# Patient Record
Sex: MALE | Race: WHITE | Employment: OTHER | URBAN - METROPOLITAN AREA
[De-identification: names, ages, dates, MRNs, and addresses within clinical notes are randomized per-mention and may not be internally consistent; named-entity substitution may affect disease eponyms.]

---

## 2019-10-28 ENCOUNTER — OFFICE VISIT (OUTPATIENT)
Dept: FAMILY MEDICINE CLINIC | Age: 34
End: 2019-10-28
Payer: MEDICAID

## 2019-10-28 VITALS
RESPIRATION RATE: 16 BRPM | BODY MASS INDEX: 24.88 KG/M2 | SYSTOLIC BLOOD PRESSURE: 107 MMHG | WEIGHT: 168 LBS | HEIGHT: 69 IN | TEMPERATURE: 97.9 F | DIASTOLIC BLOOD PRESSURE: 68 MMHG | HEART RATE: 80 BPM | OXYGEN SATURATION: 96 %

## 2019-10-28 DIAGNOSIS — F12.10 MARIJUANA ABUSE: ICD-10-CM

## 2019-10-28 DIAGNOSIS — F11.10 OPIOID ABUSE (HCC): ICD-10-CM

## 2019-10-28 DIAGNOSIS — Z72.0 TOBACCO ABUSE: ICD-10-CM

## 2019-10-28 DIAGNOSIS — F19.10 POLYSUBSTANCE ABUSE (HCC): ICD-10-CM

## 2019-10-28 DIAGNOSIS — Z23 NEED FOR IMMUNIZATION AGAINST INFLUENZA: ICD-10-CM

## 2019-10-28 DIAGNOSIS — N46.9 INFERTILITY MALE: Primary | ICD-10-CM

## 2019-10-28 PROCEDURE — G0008 ADMIN INFLUENZA VIRUS VAC: HCPCS

## 2019-10-28 PROCEDURE — 99202 OFFICE O/P NEW SF 15 MIN: CPT | Performed by: STUDENT IN AN ORGANIZED HEALTH CARE EDUCATION/TRAINING PROGRAM

## 2019-10-28 PROCEDURE — 99203 OFFICE O/P NEW LOW 30 MIN: CPT | Performed by: STUDENT IN AN ORGANIZED HEALTH CARE EDUCATION/TRAINING PROGRAM

## 2019-10-28 PROCEDURE — 6360000002 HC RX W HCPCS

## 2019-10-28 PROCEDURE — 90686 IIV4 VACC NO PRSV 0.5 ML IM: CPT

## 2019-10-29 ASSESSMENT — ENCOUNTER SYMPTOMS
ABDOMINAL PAIN: 0
CONSTIPATION: 0
DIARRHEA: 0
WHEEZING: 0
NAUSEA: 0
VOMITING: 0
SHORTNESS OF BREATH: 0

## 2019-11-05 ENCOUNTER — OFFICE VISIT (OUTPATIENT)
Dept: FAMILY MEDICINE CLINIC | Age: 34
End: 2019-11-05
Payer: MEDICAID

## 2019-11-05 VITALS
SYSTOLIC BLOOD PRESSURE: 102 MMHG | DIASTOLIC BLOOD PRESSURE: 60 MMHG | BODY MASS INDEX: 24.96 KG/M2 | TEMPERATURE: 98.3 F | OXYGEN SATURATION: 96 % | HEART RATE: 64 BPM | WEIGHT: 159 LBS | HEIGHT: 67 IN

## 2019-11-05 DIAGNOSIS — F19.10 POLYSUBSTANCE ABUSE (HCC): ICD-10-CM

## 2019-11-05 DIAGNOSIS — Z72.0 TOBACCO ABUSE: Primary | ICD-10-CM

## 2019-11-05 DIAGNOSIS — N46.9 INFERTILITY MALE: ICD-10-CM

## 2019-11-05 PROCEDURE — G8427 DOCREV CUR MEDS BY ELIG CLIN: HCPCS | Performed by: STUDENT IN AN ORGANIZED HEALTH CARE EDUCATION/TRAINING PROGRAM

## 2019-11-05 PROCEDURE — G8482 FLU IMMUNIZE ORDER/ADMIN: HCPCS | Performed by: STUDENT IN AN ORGANIZED HEALTH CARE EDUCATION/TRAINING PROGRAM

## 2019-11-05 PROCEDURE — 99212 OFFICE O/P EST SF 10 MIN: CPT | Performed by: STUDENT IN AN ORGANIZED HEALTH CARE EDUCATION/TRAINING PROGRAM

## 2019-11-05 PROCEDURE — G8420 CALC BMI NORM PARAMETERS: HCPCS | Performed by: STUDENT IN AN ORGANIZED HEALTH CARE EDUCATION/TRAINING PROGRAM

## 2019-11-05 PROCEDURE — 99213 OFFICE O/P EST LOW 20 MIN: CPT | Performed by: STUDENT IN AN ORGANIZED HEALTH CARE EDUCATION/TRAINING PROGRAM

## 2019-11-05 PROCEDURE — 4004F PT TOBACCO SCREEN RCVD TLK: CPT | Performed by: STUDENT IN AN ORGANIZED HEALTH CARE EDUCATION/TRAINING PROGRAM

## 2019-11-05 ASSESSMENT — ENCOUNTER SYMPTOMS
VOMITING: 0
ABDOMINAL PAIN: 0
WHEEZING: 0
NAUSEA: 0
DIARRHEA: 0
SHORTNESS OF BREATH: 0
CONSTIPATION: 0

## 2020-01-09 ENCOUNTER — HOSPITAL ENCOUNTER (OUTPATIENT)
Age: 35
Discharge: HOME | End: 2020-01-09
Payer: MEDICAID

## 2020-01-09 LAB
ESTRADIOL LEVEL: 24.3 PG/ML
FOLLICLE STIMULATING HORMONE: 6.3 MIU/ML
LUTEINIZING HORMONE: 9.4 MIU/ML
TESTOSTERONE TOTAL: 638.1 NG/DL
TSH SERPL DL<=0.05 MIU/L-ACNC: 2.12 UIU/ML (ref 0.27–4.2)

## 2020-01-09 PROCEDURE — 83002 ASSAY OF GONADOTROPIN (LH): CPT

## 2020-01-09 PROCEDURE — 36415 COLL VENOUS BLD VENIPUNCTURE: CPT

## 2020-01-09 PROCEDURE — 88262 CHROMOSOME ANALYSIS 15-20: CPT

## 2020-01-09 PROCEDURE — 84403 ASSAY OF TOTAL TESTOSTERONE: CPT

## 2020-01-09 PROCEDURE — 83001 ASSAY OF GONADOTROPIN (FSH): CPT

## 2020-01-09 PROCEDURE — 88230 TISSUE CULTURE LYMPHOCYTE: CPT

## 2020-01-09 PROCEDURE — 84443 ASSAY THYROID STIM HORMONE: CPT

## 2020-01-09 PROCEDURE — 82670 ASSAY OF TOTAL ESTRADIOL: CPT

## 2020-01-21 LAB
CHROMOSOME, BLOOD: NORMAL
EER CHROMOSOME ANALYSIS BLOOD: NORMAL

## 2020-02-28 PROBLEM — N46.11 OLIGOSPERMIA: Status: ACTIVE | Noted: 2020-02-28

## 2020-02-28 PROBLEM — R86.8 TERATOSPERMIA: Status: ACTIVE | Noted: 2020-02-28

## 2021-10-31 ENCOUNTER — APPOINTMENT (OUTPATIENT)
Dept: GENERAL RADIOLOGY | Age: 36
End: 2021-10-31
Payer: MEDICAID

## 2021-10-31 ENCOUNTER — HOSPITAL ENCOUNTER (EMERGENCY)
Age: 36
Discharge: LEFT BEFORE DISCHARGE | End: 2021-11-01
Attending: EMERGENCY MEDICINE
Payer: MEDICAID

## 2021-10-31 VITALS
HEART RATE: 66 BPM | TEMPERATURE: 97.8 F | OXYGEN SATURATION: 100 % | DIASTOLIC BLOOD PRESSURE: 66 MMHG | RESPIRATION RATE: 16 BRPM | SYSTOLIC BLOOD PRESSURE: 125 MMHG

## 2021-10-31 DIAGNOSIS — T50.901A ACCIDENTAL DRUG OVERDOSE, INITIAL ENCOUNTER: Primary | ICD-10-CM

## 2021-10-31 DIAGNOSIS — T39.1X1A ACCIDENTAL PARACETAMOL POISONING, INITIAL ENCOUNTER: ICD-10-CM

## 2021-10-31 LAB
ALBUMIN SERPL-MCNC: 4.8 G/DL (ref 3.5–5.2)
ALP BLD-CCNC: 105 U/L (ref 40–129)
ALT SERPL-CCNC: 9 U/L (ref 0–40)
AMPHETAMINE SCREEN, URINE: POSITIVE
ANION GAP SERPL CALCULATED.3IONS-SCNC: 13 MMOL/L (ref 7–16)
AST SERPL-CCNC: 17 U/L (ref 0–39)
BARBITURATE SCREEN URINE: NOT DETECTED
BASOPHILS ABSOLUTE: 0.06 E9/L (ref 0–0.2)
BASOPHILS RELATIVE PERCENT: 0.3 % (ref 0–2)
BENZODIAZEPINE SCREEN, URINE: NOT DETECTED
BILIRUB SERPL-MCNC: 0.5 MG/DL (ref 0–1.2)
BUN BLDV-MCNC: 9 MG/DL (ref 6–20)
CALCIUM SERPL-MCNC: 9.8 MG/DL (ref 8.6–10.2)
CANNABINOID SCREEN URINE: POSITIVE
CHLORIDE BLD-SCNC: 102 MMOL/L (ref 98–107)
CO2: 24 MMOL/L (ref 22–29)
COCAINE METABOLITE SCREEN URINE: NOT DETECTED
CREAT SERPL-MCNC: 0.9 MG/DL (ref 0.7–1.2)
EOSINOPHILS ABSOLUTE: 0 E9/L (ref 0.05–0.5)
EOSINOPHILS RELATIVE PERCENT: 0 % (ref 0–6)
FENTANYL SCREEN, URINE: POSITIVE
GFR AFRICAN AMERICAN: >60
GFR NON-AFRICAN AMERICAN: >60 ML/MIN/1.73
GLUCOSE BLD-MCNC: 127 MG/DL (ref 74–99)
HCT VFR BLD CALC: 48.7 % (ref 37–54)
HEMOGLOBIN: 15.7 G/DL (ref 12.5–16.5)
IMMATURE GRANULOCYTES #: 0.06 E9/L
IMMATURE GRANULOCYTES %: 0.3 % (ref 0–5)
LYMPHOCYTES ABSOLUTE: 1.83 E9/L (ref 1.5–4)
LYMPHOCYTES RELATIVE PERCENT: 10.6 % (ref 20–42)
Lab: ABNORMAL
MCH RBC QN AUTO: 25.3 PG (ref 26–35)
MCHC RBC AUTO-ENTMCNC: 32.2 % (ref 32–34.5)
MCV RBC AUTO: 78.5 FL (ref 80–99.9)
METHADONE SCREEN, URINE: NOT DETECTED
MONOCYTES ABSOLUTE: 1.04 E9/L (ref 0.1–0.95)
MONOCYTES RELATIVE PERCENT: 6 % (ref 2–12)
NEUTROPHILS ABSOLUTE: 14.3 E9/L (ref 1.8–7.3)
NEUTROPHILS RELATIVE PERCENT: 82.8 % (ref 43–80)
OPIATE SCREEN URINE: NOT DETECTED
OXYCODONE URINE: NOT DETECTED
PDW BLD-RTO: 13.9 FL (ref 11.5–15)
PHENCYCLIDINE SCREEN URINE: NOT DETECTED
PLATELET # BLD: 274 E9/L (ref 130–450)
PMV BLD AUTO: 10.1 FL (ref 7–12)
POTASSIUM REFLEX MAGNESIUM: 3.6 MMOL/L (ref 3.5–5)
RBC # BLD: 6.2 E12/L (ref 3.8–5.8)
SODIUM BLD-SCNC: 139 MMOL/L (ref 132–146)
TOTAL PROTEIN: 7.7 G/DL (ref 6.4–8.3)
WBC # BLD: 17.3 E9/L (ref 4.5–11.5)

## 2021-10-31 PROCEDURE — 99285 EMERGENCY DEPT VISIT HI MDM: CPT

## 2021-10-31 PROCEDURE — 71045 X-RAY EXAM CHEST 1 VIEW: CPT

## 2021-10-31 PROCEDURE — 80053 COMPREHEN METABOLIC PANEL: CPT

## 2021-10-31 PROCEDURE — 85025 COMPLETE CBC W/AUTO DIFF WBC: CPT

## 2021-10-31 PROCEDURE — 80143 DRUG ASSAY ACETAMINOPHEN: CPT

## 2021-10-31 PROCEDURE — 80307 DRUG TEST PRSMV CHEM ANLYZR: CPT

## 2021-10-31 PROCEDURE — 82077 ASSAY SPEC XCP UR&BREATH IA: CPT

## 2021-10-31 PROCEDURE — 80179 DRUG ASSAY SALICYLATE: CPT

## 2021-10-31 ASSESSMENT — ENCOUNTER SYMPTOMS
EYE DISCHARGE: 0
NAUSEA: 0
SHORTNESS OF BREATH: 0
SINUS PRESSURE: 0
DIARRHEA: 0
BACK PAIN: 0
COUGH: 0
VOMITING: 0
WHEEZING: 0
SORE THROAT: 0
ABDOMINAL PAIN: 0
EYE PAIN: 0
EYE REDNESS: 0

## 2021-11-01 LAB
ACETAMINOPHEN LEVEL: 10.1 MCG/ML (ref 10–30)
ACETAMINOPHEN LEVEL: 17.4 MCG/ML (ref 10–30)
ETHANOL: <10 MG/DL (ref 0–0.08)
ETHANOL: <10 MG/DL (ref 0–0.08)
SALICYLATE, SERUM: <0.3 MG/DL (ref 0–30)
SALICYLATE, SERUM: <0.3 MG/DL (ref 0–30)
TRICYCLIC ANTIDEPRESSANTS SCREEN SERUM: NEGATIVE NG/ML
TRICYCLIC ANTIDEPRESSANTS SCREEN SERUM: NEGATIVE NG/ML

## 2021-11-01 NOTE — ED NOTES
Pt takes off clothes and is in gown, pt states \" he is stressed out at home and just wanted to go to sleep. He does not want to harm himself or others, he told his brother he would stop taking pills so that's why he is here because he wants help\". Pt is very cooperative and restless.       John Hayes RN  10/31/21 [id number]

## 2021-11-01 NOTE — ED NOTES
Rounded on patient, pt and family member no longer in room. All belongings removed from room. Dr. Karl Banda aware.  Pt not pinkslipped or suicidal.     Mitul Borjas RN  11/01/21 0001

## 2021-11-01 NOTE — ED PROVIDER NOTES
Patient presents after taking numerous medications and drugs. Initially patient does not respond to my questioning however after mention of possible pink slipped he states that he does not have any suicidal or homicidal ideations. He is accompanied with his brother who states that the patient took Tylenol, NyQuil, Percocets, and ecstasy. Patient just responds that he has a pharmacy in his stomach. Patient states that he took the medication to feel good. He denies any complaints at this time but states that he does want help getting off the drugs. Patient denies any fevers, chest pain, shortness of breath, nausea, vomiting, or abdominal pain. The history is provided by the patient. No  was used. Review of Systems   Constitutional: Negative for chills and fever. HENT: Negative for ear pain, sinus pressure and sore throat. Eyes: Negative for pain, discharge and redness. Respiratory: Negative for cough, shortness of breath and wheezing. Cardiovascular: Negative for chest pain. Gastrointestinal: Negative for abdominal pain, diarrhea, nausea and vomiting. Genitourinary: Negative for dysuria and frequency. Musculoskeletal: Negative for arthralgias and back pain. Skin: Negative for rash and wound. Neurological: Negative for weakness and headaches. Hematological: Negative for adenopathy. Psychiatric/Behavioral: Negative for hallucinations and suicidal ideas. All other systems reviewed and are negative. Physical Exam  Vitals and nursing note reviewed. Constitutional:       Appearance: He is well-developed. Comments: Somnolent   HENT:      Head: Normocephalic and atraumatic. Eyes:      Conjunctiva/sclera: Conjunctivae normal.      Comments: Cataract right eye. Left pupil is 3 mm and reactive. Cardiovascular:      Rate and Rhythm: Normal rate and regular rhythm. Heart sounds: Normal heart sounds. No murmur heard.      Pulmonary:      Effort: Pulmonary effort is normal. No respiratory distress. Breath sounds: Normal breath sounds. No wheezing or rales. Abdominal:      General: Bowel sounds are normal.      Palpations: Abdomen is soft. Tenderness: There is no abdominal tenderness. There is no guarding or rebound. Musculoskeletal:         General: No tenderness or deformity. Cervical back: Normal range of motion and neck supple. Skin:     General: Skin is warm and dry. Neurological:      Mental Status: He is oriented to person, place, and time. Cranial Nerves: No cranial nerve deficit. Coordination: Coordination normal.          Procedures     MDM  Number of Diagnoses or Management Options  Diagnosis management comments: Patient presents after ingesting numerous medications. Patient does have a history of drug abuse. He denies any suicidal homicidal ideations at this time. Patient will not be pink slipped at this time. CBC was obtained that showed leukocytosis of 17.3. CMP did not show any elevations in transaminases. Chest x-ray did not show any acute cardiopulmonary processes. Urine drug screen was positive for amphetamines, cannabinoids, and fentanyl. Initial serum drug screen did show an elevated acetaminophen level at 17.4. A 4-hour serum drug screen was drawn and is pending however patient did elope prior to result. Patient will be called if the acetaminophen level is above 150. Amount and/or Complexity of Data Reviewed  Clinical lab tests: reviewed  Tests in the radiology section of CPT®: reviewed  Decide to obtain previous medical records or to obtain history from someone other than the patient: yes         ED Course as of Nov 01 0021   Mon Nov 01, 2021   0019 Patient had previously wanted to leave [place]. Patient was informed of the risk including death and initially decided to stay however patient then eloped.     [BB]      ED Course User Index  [BB] Talita Mitchell DO --------------------------------- ADDITIONAL PROVIDER NOTES ---------------------------------  12:20 AM EDT  Patient appears to have eloped from the emergency department. Multiple attempts were made to locate the patient but were unsuccessful. Patient was unable to be provided with results, plan for further treatment, and recommendations regarding need for follow-up or return to the emergency department due to leaving prior to final results review and disposition discussion. New Prescriptions    No medications on file       Diagnosis:  1. Accidental drug overdose, initial encounter    2. Accidental paracetamol poisoning, initial encounter        Disposition:  Patient's disposition: Eloped. Patient's condition is Unknown.     Patient was seen and evaluated by both myself and Lawyer Gentile, [address] Ame Pozo DO  Resident  11/01/21 [id number]

## 2021-11-01 NOTE — ED NOTES
This RN to room for hourly check, pt and family not in room. MD and charge RN notified.      Dayo Prieto RN  11/01/21 [id number]

## 2022-04-05 ENCOUNTER — HOSPITAL ENCOUNTER (EMERGENCY)
Age: 37
Discharge: HOME | End: 2022-04-05
Payer: MEDICAID

## 2022-04-05 ENCOUNTER — APPOINTMENT (OUTPATIENT)
Dept: GENERAL RADIOLOGY | Age: 37
End: 2022-04-05
Payer: MEDICAID

## 2022-04-05 VITALS
RESPIRATION RATE: 18 BRPM | TEMPERATURE: 97.6 F | HEART RATE: 77 BPM | SYSTOLIC BLOOD PRESSURE: 114 MMHG | DIASTOLIC BLOOD PRESSURE: 83 MMHG | OXYGEN SATURATION: 96 %

## 2022-04-05 DIAGNOSIS — S63.501A RIGHT WRIST SPRAIN, INITIAL ENCOUNTER: Primary | ICD-10-CM

## 2022-04-05 PROCEDURE — 73130 X-RAY EXAM OF HAND: CPT

## 2022-04-05 PROCEDURE — 73110 X-RAY EXAM OF WRIST: CPT

## 2022-04-05 PROCEDURE — 99283 EMERGENCY DEPT VISIT LOW MDM: CPT

## 2022-04-05 PROCEDURE — 29125 APPL SHORT ARM SPLINT STATIC: CPT

## 2022-04-05 RX ORDER — IBUPROFEN 600 MG/1
600 TABLET ORAL 3 TIMES DAILY PRN
Qty: 30 TABLET | Refills: 0 | Status: SHIPPED | OUTPATIENT
Start: 2022-04-05

## 2022-04-05 RX ORDER — HYDROCODONE BITARTRATE AND ACETAMINOPHEN 5; 325 MG/1; MG/1
1 TABLET ORAL EVERY 6 HOURS PRN
Qty: 6 TABLET | Refills: 0 | Status: SHIPPED | OUTPATIENT
Start: 2022-04-05 | End: 2022-04-08

## 2022-04-05 RX ORDER — 0.9 % SODIUM CHLORIDE 0.9 %
1000 INTRAVENOUS SOLUTION INTRAVENOUS ONCE
Status: DISCONTINUED | OUTPATIENT
Start: 2022-04-05 | End: 2022-04-05

## 2022-04-05 ASSESSMENT — PAIN SCALES - GENERAL: PAINLEVEL_OUTOF10: 6

## 2022-04-05 ASSESSMENT — PAIN - FUNCTIONAL ASSESSMENT: PAIN_FUNCTIONAL_ASSESSMENT: 0-10

## 2022-04-05 NOTE — ED PROVIDER NOTES
[facility]                                                                                                                                    Department of Emergency Medicine   ED  Provider Note  Admit Date/RoomTime: 4/5/2022  3:21 PM  ED Room: /        HPI:  4/5/22,   Time: 3:21 PM EDT         Ben Woodward is a 39 y.o. male presenting to the ED for fall with pain and injury to right hand/wrist, beginning early this AM.  The complaint has been persistent, moderate in severity, and worsened by movement of right hand/fingers. Pt states he tripped and fell, landing on his right hand. Reports dorsal surface of hand impacted the floor first.   Denies hitting his head. Denies LOC. States initially he had minimal pain and could move his fingers but that the pain and swelling has gotten much worse as day has gone on. Has not taken anything for pain because of Ramadan. Denies pain in right elbow or shoulder. ROS:     Constitutional: Negative for fever and chills  HENT: Negative for ear pain, sore throat and sinus pressure  Eyes: Negative for pain, discharge and redness  Respiratory:  Negative for shortness of breath, cough and wheezing  Cardiovascular: Negative for CP, edema or palpitations  Gastrointestinal: Negative for nausea, vomiting, diarrhea and abdominal distention  Genitourinary: Negative for dysuria and frequency  Musculoskeletal: See HPI  Skin: Negative for rash and wound  Neurological: Negative for weakness and headaches  Hematological: Negative for adenopathy    All other systems reviewed and are negative      -------------------------------- PAST HISTORY ----------------------------------  Past Medical History:  has no past medical history on file. Past Surgical History:  has a past surgical history that includes eye surgery. Social History:  reports that he has been smoking cigarettes. He has been smoking about 2.00 packs per day.  He has never used smokeless tobacco. He reports current drug use. Frequency: 3.00 times per week. Drug: Marijuana Too Benites). He reports that he does not drink alcohol. Family History: family history is not on file. The patients home medications have been reviewed. Allergies: Pork-derived products    --------------------------------- RESULTS ------------------------------------------  All laboratory and radiology results have been personally reviewed by myself   LABS:  No results found for this visit on 04/05/22. RADIOLOGY:  Interpreted by Radiologist.  XR HAND RIGHT (MIN 3 VIEWS)   Final Result   Negative for fracture or acute osseous abnormality. XR WRIST RIGHT (MIN 3 VIEWS)   Final Result   Negative for fracture or acute osseous abnormality.             ----------------- NURSING NOTES AND VITALS REVIEWED ---------------   The nursing notes within the ED encounter and vital signs as below have been reviewed. /83   Pulse 77   Temp 97.6 °F (36.4 °C)   Resp 18   SpO2 96%   Oxygen Saturation Interpretation: Normal      --------------------------------PHYSICAL EXAM------------------------------------      Constitutional/General: Alert and oriented x3, well appearing, non toxic in NAD  Head: NC/AT  Eyes: PERRL, EOMI  Mouth: Oropharynx clear, handling secretions, no trismus  Neck: Supple, full ROM, no meningeal signs  Pulmonary: Lungs clear to auscultation bilaterally, no wheezes, rales, or rhonchi. Not in respiratory distress  Cardiovascular:  Regular rate and rhythm, no murmurs, gallops, or rubs. 2+ distal pulses  Extremities: Moves all extremities x 4. Pt has localized swelling and point tenderness over dorsal surface of right hand, 2/3/4 metacarpals. Markedly decreased ROM due to pain. Unable to make a fist.   Mildly tender over dorsal surface right distal radius and ulna. Pulses and sensation intact. Warm and well perfused  Skin: warm and dry without rash  Neurologic: GCS 15,  Intact.   No focal deficits  Psych: Normal Affect      ------------------------ ED COURSE/MEDICAL DECISION MAKING----------------------  Medications - No data to display      Medical Decision Making:    No acute fracture seen on imaging of wrist/hand. Pt reassured. Wrist splint applied. Pt requesting medication for pain. Rest, ice and NSAID's for home. Advised to F/U with Ortho for repeat X-rays if recurrent or ongoing pain. Pt aware and agreeable to plan        Counseling: The emergency provider has spoken with the patient and discussed todays results, in addition to providing specific details for the plan of care and counseling regarding the diagnosis and prognosis. Questions are answered at this time and they are agreeable with the plan.      ------------------------ IMPRESSION AND DISPOSITION -------------------------------    IMPRESSION  1.  Right wrist sprain, initial encounter        DISPOSITION  Disposition: Discharge to home  Patient condition is stable                   Quinn FlatterFLORY  04/05/22 1610       Quinn FlatterFLORY  04/05/22 1620